# Patient Record
Sex: FEMALE | Race: WHITE | URBAN - METROPOLITAN AREA
[De-identification: names, ages, dates, MRNs, and addresses within clinical notes are randomized per-mention and may not be internally consistent; named-entity substitution may affect disease eponyms.]

---

## 2022-10-13 ENCOUNTER — OFFICE VISIT (OUTPATIENT)
Dept: ORTHOPEDIC SURGERY | Age: 47
End: 2022-10-13
Payer: COMMERCIAL

## 2022-10-13 VITALS — HEIGHT: 63 IN | WEIGHT: 170 LBS | BODY MASS INDEX: 30.12 KG/M2

## 2022-10-13 DIAGNOSIS — M75.02 ADHESIVE CAPSULITIS OF LEFT SHOULDER: Primary | ICD-10-CM

## 2022-10-13 PROBLEM — R61 DIAPHORESIS: Status: ACTIVE | Noted: 2022-01-06

## 2022-10-13 PROBLEM — L65.9 HAIR LOSS: Status: ACTIVE | Noted: 2022-01-06

## 2022-10-13 PROBLEM — R10.11 RIGHT UPPER QUADRANT PAIN: Status: ACTIVE | Noted: 2018-11-14

## 2022-10-13 PROBLEM — E04.9 GOITER: Status: ACTIVE | Noted: 2022-01-06

## 2022-10-13 PROBLEM — R19.7 DIARRHEA: Status: ACTIVE | Noted: 2022-01-06

## 2022-10-13 PROBLEM — K62.5 RECTAL HEMORRHAGE: Status: ACTIVE | Noted: 2022-10-13

## 2022-10-13 PROCEDURE — L3660 SO 8 AB RSTR CAN/WEB PRE OTS: HCPCS | Performed by: ORTHOPAEDIC SURGERY

## 2022-10-13 PROCEDURE — 99204 OFFICE O/P NEW MOD 45 MIN: CPT | Performed by: ORTHOPAEDIC SURGERY

## 2022-10-13 PROCEDURE — 20610 DRAIN/INJ JOINT/BURSA W/O US: CPT | Performed by: ORTHOPAEDIC SURGERY

## 2022-10-13 RX ORDER — FLUOCINONIDE CREAM (EMULSIFIED BASE) 0.5 MG/G
CREAM TOPICAL
COMMUNITY
Start: 2022-08-28

## 2022-10-13 RX ORDER — BUPROPION HYDROCHLORIDE 150 MG/1
TABLET ORAL
COMMUNITY
Start: 2022-10-11

## 2022-10-13 RX ORDER — ATENOLOL 100 MG/1
100 TABLET ORAL DAILY
COMMUNITY
Start: 2020-09-29

## 2022-10-13 RX ORDER — LOSARTAN POTASSIUM 100 MG/1
TABLET ORAL
COMMUNITY
Start: 2022-09-20

## 2022-10-13 RX ORDER — ARIPIPRAZOLE 10 MG/1
TABLET ORAL
COMMUNITY
Start: 2021-08-13

## 2022-10-13 RX ORDER — CLONAZEPAM 0.5 MG/1
TABLET ORAL
COMMUNITY
Start: 2022-09-27

## 2022-10-13 RX ORDER — PROPRANOLOL HCL 60 MG
CAPSULE, EXTENDED RELEASE 24HR ORAL
COMMUNITY
Start: 2022-10-11

## 2022-10-13 RX ORDER — METHYLPREDNISOLONE ACETATE 40 MG/ML
40 INJECTION, SUSPENSION INTRA-ARTICULAR; INTRALESIONAL; INTRAMUSCULAR; SOFT TISSUE ONCE
Status: COMPLETED | OUTPATIENT
Start: 2022-10-13 | End: 2022-10-13

## 2022-10-13 RX ORDER — ONDANSETRON 4 MG/1
4 TABLET, ORALLY DISINTEGRATING ORAL EVERY 6 HOURS PRN
COMMUNITY
Start: 2021-11-16

## 2022-10-13 RX ORDER — CITALOPRAM 10 MG/1
TABLET ORAL
COMMUNITY
Start: 2021-09-29

## 2022-10-13 RX ORDER — METHYLPREDNISOLONE 4 MG/1
TABLET ORAL
Qty: 1 KIT | Refills: 1 | Status: SHIPPED | OUTPATIENT
Start: 2022-10-13

## 2022-10-13 RX ORDER — BUPIVACAINE HYDROCHLORIDE 5 MG/ML
2 INJECTION, SOLUTION PERINEURAL ONCE
Status: COMPLETED | OUTPATIENT
Start: 2022-10-13 | End: 2022-10-13

## 2022-10-13 RX ORDER — QUETIAPINE FUMARATE 50 MG/1
50 TABLET, FILM COATED ORAL DAILY
COMMUNITY

## 2022-10-13 RX ORDER — HYDROCODONE BITARTRATE AND ACETAMINOPHEN 5; 325 MG/1; MG/1
1 TABLET ORAL EVERY 6 HOURS PRN
COMMUNITY
Start: 2021-11-16

## 2022-10-13 RX ORDER — MELOXICAM 15 MG/1
15 TABLET ORAL DAILY
Qty: 30 TABLET | Refills: 0 | Status: SHIPPED | OUTPATIENT
Start: 2022-10-13

## 2022-10-13 RX ADMIN — METHYLPREDNISOLONE ACETATE 40 MG: 40 INJECTION, SUSPENSION INTRA-ARTICULAR; INTRALESIONAL; INTRAMUSCULAR; SOFT TISSUE at 13:53

## 2022-10-13 RX ADMIN — BUPIVACAINE HYDROCHLORIDE 10 MG: 5 INJECTION, SOLUTION PERINEURAL at 13:54

## 2022-10-13 NOTE — PROGRESS NOTES
[address] [facility]  Outpatient Progress Note  Marti Muñoz MD    Patient Name: Be Padilla MRN: [id number]   Age: 52 y.o. YOB: 1975   Sex: female      [address] Complaint   Patient presents with    Shoulder Pain     Patient has has severe Left shoulder pain for months and has now gotten to where she \"cannont use the arm\" for any activities or loss of ROM due to the pain. HISTORY OF PRESENT ILLNESS   Be Padilla is a 52 y.o. female presents to the office today for evaluation of left shoulder pain and decreased range of motion. Symptoms have been insidious in onset beginning a couple of months ago gradually worsening to the point where she has severe pain when she tries to use her left arm. She is left-hand dominant. She had no injury. She has had no prior evaluation or treatment for this problem. She has tried over-the-counter ibuprofen    PAST MEDICAL HISTORY    No past medical history on file. PAST SURGICAL HISTORY   No past surgical history on file. MEDICATIONS     Current Outpatient Medications   Medication Sig Dispense Refill    ARIPiprazole (ABILIFY) 10 MG tablet TAKE 1 TABLET BY MOUTH EVERY DAY      atenolol (TENORMIN) 100 MG tablet Take 100 mg by mouth daily      citalopram (CELEXA) 10 MG tablet TAKE 1 TABLET BY MOUTH EVERY DAY      HYDROcodone-acetaminophen (NORCO) 5-325 MG per tablet Take 1 tablet by mouth every 6 hours as needed. ondansetron (ZOFRAN-ODT) 4 MG disintegrating tablet Take 4 mg by mouth every 6 hours as needed      methylPREDNISolone (MEDROL, VIETH,) 4 MG tablet Take by mouth.  1 kit 1    meloxicam (MOBIC) 15 MG tablet Take 1 tablet by mouth daily 30 tablet 0    buPROPion (WELLBUTRIN XL) 150 MG extended release tablet TAKE 1 TABLET BY MOUTH EVERY MORNING      clonazePAM (KLONOPIN) 0.5 MG tablet TAKE 1 TABLET BY MOUTH THREE TIMES A DAY AS NEEDED FOR ANXIETY      Fluocinonide Emulsified Base 0.05 % CREA APPLY TWO TIMES A DAY AS NEEDED      losartan (COZAAR) 100 MG tablet TAKE 1 TABLET BY MOUTH EVERY DAY      propranolol (INDERAL LA) 60 MG extended release capsule TAKE 1 CAPSULE BY MOUTH EVERY MORNING      QUEtiapine (SEROQUEL) 50 MG tablet Take 50 mg by mouth daily       No current facility-administered medications for this visit. ALLERGIES     Allergies   Allergen Reactions    Sulfa Antibiotics Shortness Of Breath    Morphine Other (See Comments) and Rash     \"my head was on fire\"      Peppermint Oil Hives, Nausea And Vomiting and Nausea Only       FAMILY HISTORY   No family history on file. SOCIAL HISTORY     Social History     Socioeconomic History    Marital status:      Spouse name: None    Number of children: None    Years of education: None    Highest education level: None   Tobacco Use    Smoking status: Never    Smokeless tobacco: Never   Substance and Sexual Activity    Alcohol use: Never       REVIEW OF SYSTEMS   General: no fever, chills, night sweats, anorexia, malaise, fatigue, or weight change  Hematologic:  no unexplained bleeding or bruising  HEENT:   no nasal congestion, rhinorrhea, sore throat, or facial pain  Respiratory:  no cough, dyspnea, or chest pain  Cardiovascular:  no angina, ROBLERO, PND, orthopnea, dependent edema, or palpitations  Gastrointestinal:  no nausea, vomiting, diarrhea, constipation, or abdominal pain  Genitourinary:  no urinary urgency, frequency, dysuria, or hematuria  Musculoskeletal: see HPI  Endocrine:  no heat or cold intolerance and no polyphagia, polydipsia, or polyuria  Skin:  no skin eruptions or changing lesions  Neurologic:  no focal weakness, numbness/tingling, tremor, or severe headache. See HPI. See HPI for pertinent positives.     PHYSICAL EXAM   Vital Signs: Ht 5' 3\" (1.6 m)   Wt 170 lb (77.1 kg)   BMI 30.11 kg/m²     General appearance: healthy, alert, no distress  Skin: Skin color, texture, turgor normal. No rashes or lesions  HEENT: atraumatic, normocephalic. PERRL  Respiratory: Unlabored breathing  Lymphatic: No adenopathy   Neuro: Alert and oriented, normal distal sensation, normal bilateral DTRs  Vascular: Normal distal capillary and distal pulses  Muskuloskeletal Exam:   Left Shoulder Examination    Inspection: There is no deformity, swelling, ecchymosis or atrophy present. Palpation: There is diffuse tenderness to palpation. Range of Motion: 120 degrees of forward flexion 70 degrees of abduction    Strength: There is mild global weakness detected,    Special Tests: There is pain throughout range of motion. She is unable to reach the base of her skull or the small of her back          RADIOLOGY   X-rays obtained and reviewed in office:  Views left shoulder 3 views    Impression: No acute abnormality, mild acromioclavicular joint arthrosis    IMPRESSION     1. Adhesive capsulitis of left shoulder         PLAN   I had a lengthy discussion with patient today regarding diagnosis and treatment options and recommendations. Plan to proceed with a corticosteroid injection in the subacromial space. I will prescribe a Medrol Dosepak and meloxicam and refer her to physical therapy    FOLLOWUP     Return in about 4 weeks (around 11/10/2022) for Reevaluation. Orders Placed This Encounter   Procedures    XR SHOULDER LEFT (MIN 2 VIEWS)     Standing Status:   Future     Number of Occurrences:   1     Standing Expiration Date:   10/13/2023    [facility]     Referral Priority:   Routine     Referral Type:   Eval and Treat     Referral Reason:   Specialty Services Required     Requested Specialty:   Physical Therapist     Number of Visits Requested:   1      Orders Placed This Encounter   Medications    methylPREDNISolone (MEDROL, IVETH,) 4 MG tablet     Sig: Take by mouth.      Dispense:  1 kit     Refill:  1    meloxicam (MOBIC) 15 MG tablet     Sig: Take 1 tablet by mouth daily     Dispense:  30 tablet     Refill:  0       Patient was instructed on appropriate use of braces, participation in home exercise programs, healthy lifestyle choices and weight loss as appropriate     Bonifacio Pérez MD

## 2022-10-13 NOTE — PATIENT INSTRUCTIONS
Joint Injection After [address] [facility]  Olesya Forman MD    Refer to this sheet in the next few days. These insructions provide you with information on caring for yourself after you have had a joint injection. Your caregiver also may give you more specific instructions. Your treatment has been planned according to current medical practices, but problems sometimes occur. Call your caregiver if you have any problems or questions after your procedure. After any type of joint injection, it is not uncommon to experience:    A temporary increase in pain which should resolve within 2-3 days  Soreness, swelling, or bruising around the injection site  Mild numbness, tingling, or weakness around the injection site caused by the numbing medicine used before or with the injection    It is also possible to experience the following effects associated with the specific agent after injection:    Corticosteroids (these effects are rare): Allergic reaction  Increased blood sugar levels (if you have diabetes and you notice that your blood sugar levels have increased, notify your caregiver)  Increased blood pressure levels  Mood swings  Hyaluronic acid in the use of viscosupplementation  Temporary heat or redness  Temporary rash and itching  Increased fluid accumulation in the injected join    These effects all should resolve within a day or two after your procedure. HOME CARE INSTRUCTIONS    Limit yourself to light activity the day of your procedure. Avoid lifting heavy objections, bending, stooping or twisting  Take prescription or over-the-counter pain medication as directed by your caregiver  You may apply ice to your injection site to reduce pain and swelling the day of your procedure.  Ice may be applied 3-4 times:  Put ice in a plastic bag  Place a towel between your skin and the bag  Leave the ice on for no longer than 15-20 minutes each time    FOLLOW-UP INSTRUCTIONS    Please make sure you keep your follow-up appointment as indicated by your physician.

## 2022-10-25 ENCOUNTER — HOSPITAL ENCOUNTER (OUTPATIENT)
Dept: PHYSICAL THERAPY | Age: 47
Setting detail: THERAPIES SERIES
Discharge: HOME | End: 2022-10-25

## 2022-10-25 NOTE — PROGRESS NOTES
[facility]    Physical Therapy  Cancellation/No-show Note  Patient Name:  Meseret Solis  :  1975   Date:  10/25/2022    Cancelled visits to date: 1 eval  No-shows to date: 0    For today's appointment patient:  [x]  Cancelled 10/25  []  Rescheduled appointment  []  No-show     Reason given by patient:  [x]  Patient ill  []  Conflicting appointment  []  No transportation    []  Conflict with work  []  No reason given  []  Other:     Comments:      Phone call information:   []  Phone call made today to patient at _ time at number provided:      []  Patient answered, conversation as follows:    []  Patient did not answer, message left as follows:  []  Phone call not made today  [x]  Phone call not needed - pt contacted us to cancel and provided reason for cancellation.      Electronically signed by:  Kirsten Baltazar, PT, MPT

## 2022-11-01 ENCOUNTER — HOSPITAL ENCOUNTER (OUTPATIENT)
Dept: PHYSICAL THERAPY | Age: 47
Setting detail: THERAPIES SERIES
Discharge: HOME | End: 2022-11-01

## 2022-11-01 NOTE — PROGRESS NOTES
[facility]    Physical Therapy  Cancellation/No-show Note  Patient Name:  Eliseo Jaramillo  :  1975   Date:  2022    Cancelled visits to date: 12eval  No-shows to date: 0    For today's appointment patient:  [x]  Cancelled 10/25,   []  Rescheduled appointment  []  No-show     Reason given by patient:  [x]  Patient ill  []  Conflicting appointment  []  No transportation    []  Conflict with work  []  No reason given  []  Other:     Comments:      Phone call information:   []  Phone call made today to patient at _ time at number provided:      []  Patient answered, conversation as follows:    []  Patient did not answer, message left as follows:  []  Phone call not made today  [x]  Phone call not needed - pt contacted us to cancel and provided reason for cancellation.      Electronically signed by:  Mj Contreras, PT, MPT

## 2022-11-10 ENCOUNTER — HOSPITAL ENCOUNTER (OUTPATIENT)
Dept: PHYSICAL THERAPY | Age: 47
Setting detail: THERAPIES SERIES
Discharge: HOME | End: 2022-11-10

## 2022-11-10 NOTE — PROGRESS NOTES
[facility]    Physical Therapy  Cancellation/No-show Note  Patient Name:  Ioana Jones  :  1975   Date:  11/10/2022    Cancelled visits to date: 0  No-shows to date: 1 eval    For today's appointment patient:  []  Cancelled  []  Rescheduled appointment  [x]  No-show  11/10     Reason given by patient:  []  Patient ill  []  Conflicting appointment  []  No transportation    []  Conflict with work  []  No reason given  []  Other:     Comments:      Phone call information:   []  Phone call made today to patient at _ time at number provided:      []  Patient answered, conversation as follows:    []  Patient did not answer, message left as follows:  [x]  Phone call not made today  []  Phone call not needed - pt contacted us to cancel and provided reason for cancellation.      Electronically signed by:  Subhash Cross, PT, MPT

## 2024-06-25 LAB — MAMMOGRAPHY, EXTERNAL: NORMAL

## 2025-04-03 SDOH — HEALTH STABILITY: PHYSICAL HEALTH: ON AVERAGE, HOW MANY MINUTES DO YOU ENGAGE IN EXERCISE AT THIS LEVEL?: 0 MIN

## 2025-04-03 SDOH — HEALTH STABILITY: PHYSICAL HEALTH: ON AVERAGE, HOW MANY DAYS PER WEEK DO YOU ENGAGE IN MODERATE TO STRENUOUS EXERCISE (LIKE A BRISK WALK)?: 0 DAYS

## 2025-04-04 ENCOUNTER — OFFICE VISIT (OUTPATIENT)
Dept: PRIMARY CARE CLINIC | Age: 50
End: 2025-04-04
Payer: COMMERCIAL

## 2025-04-04 VITALS
HEIGHT: 63 IN | OXYGEN SATURATION: 98 % | SYSTOLIC BLOOD PRESSURE: 138 MMHG | TEMPERATURE: 97.8 F | HEART RATE: 65 BPM | BODY MASS INDEX: 45.46 KG/M2 | DIASTOLIC BLOOD PRESSURE: 72 MMHG | WEIGHT: 256.6 LBS

## 2025-04-04 DIAGNOSIS — R53.83 FATIGUE, UNSPECIFIED TYPE: ICD-10-CM

## 2025-04-04 DIAGNOSIS — E66.01 CLASS 3 SEVERE OBESITY DUE TO EXCESS CALORIES WITHOUT SERIOUS COMORBIDITY WITH BODY MASS INDEX (BMI) OF 45.0 TO 49.9 IN ADULT: ICD-10-CM

## 2025-04-04 DIAGNOSIS — Z13.0 SCREENING FOR DEFICIENCY ANEMIA: ICD-10-CM

## 2025-04-04 DIAGNOSIS — Z13.1 DIABETES MELLITUS SCREENING: Primary | ICD-10-CM

## 2025-04-04 DIAGNOSIS — Z13.21 ENCOUNTER FOR VITAMIN DEFICIENCY SCREENING: ICD-10-CM

## 2025-04-04 DIAGNOSIS — Z13.220 LIPID SCREENING: ICD-10-CM

## 2025-04-04 DIAGNOSIS — E66.813 CLASS 3 SEVERE OBESITY DUE TO EXCESS CALORIES WITHOUT SERIOUS COMORBIDITY WITH BODY MASS INDEX (BMI) OF 45.0 TO 49.9 IN ADULT: ICD-10-CM

## 2025-04-04 DIAGNOSIS — F60.3 BORDERLINE PERSONALITY DISORDER (HCC): ICD-10-CM

## 2025-04-04 DIAGNOSIS — G47.00 CONTROLLED INSOMNIA: ICD-10-CM

## 2025-04-04 DIAGNOSIS — Z12.4 CERVICAL CANCER SCREENING: ICD-10-CM

## 2025-04-04 DIAGNOSIS — Z11.4 SCREENING FOR HUMAN IMMUNODEFICIENCY VIRUS: ICD-10-CM

## 2025-04-04 DIAGNOSIS — F41.9 ANXIETY: ICD-10-CM

## 2025-04-04 DIAGNOSIS — Z00.00 PERIODIC HEALTH ASSESSMENT, GENERAL SCREENING, ADULT: ICD-10-CM

## 2025-04-04 DIAGNOSIS — F31.9 BIPOLAR DEPRESSION (HCC): ICD-10-CM

## 2025-04-04 DIAGNOSIS — Z13.29 THYROID DISORDER SCREENING: ICD-10-CM

## 2025-04-04 DIAGNOSIS — Z12.31 BREAST CANCER SCREENING BY MAMMOGRAM: ICD-10-CM

## 2025-04-04 DIAGNOSIS — Z11.59 ENCOUNTER FOR HCV SCREENING TEST FOR LOW RISK PATIENT: ICD-10-CM

## 2025-04-04 PROBLEM — R10.11 RIGHT UPPER QUADRANT PAIN: Status: RESOLVED | Noted: 2018-11-14 | Resolved: 2025-04-04

## 2025-04-04 PROBLEM — R61 DIAPHORESIS: Status: RESOLVED | Noted: 2022-01-06 | Resolved: 2025-04-04

## 2025-04-04 PROBLEM — K62.5 RECTAL HEMORRHAGE: Status: RESOLVED | Noted: 2022-10-13 | Resolved: 2025-04-04

## 2025-04-04 PROBLEM — L65.9 HAIR LOSS: Status: RESOLVED | Noted: 2022-01-06 | Resolved: 2025-04-04

## 2025-04-04 PROBLEM — R19.7 DIARRHEA: Status: RESOLVED | Noted: 2022-01-06 | Resolved: 2025-04-04

## 2025-04-04 PROCEDURE — 99204 OFFICE O/P NEW MOD 45 MIN: CPT | Performed by: NURSE PRACTITIONER

## 2025-04-04 PROCEDURE — 3075F SYST BP GE 130 - 139MM HG: CPT | Performed by: NURSE PRACTITIONER

## 2025-04-04 PROCEDURE — 3078F DIAST BP <80 MM HG: CPT | Performed by: NURSE PRACTITIONER

## 2025-04-04 RX ORDER — DEXTROAMPHETAMINE SACCHARATE, AMPHETAMINE ASPARTATE MONOHYDRATE, DEXTROAMPHETAMINE SULFATE AND AMPHETAMINE SULFATE 3.75; 3.75; 3.75; 3.75 MG/1; MG/1; MG/1; MG/1
15 CAPSULE, EXTENDED RELEASE ORAL DAILY
COMMUNITY
Start: 2024-04-16 | End: 2025-04-04 | Stop reason: DRUGHIGH

## 2025-04-04 RX ORDER — DEXTROAMPHETAMINE SACCHARATE, AMPHETAMINE ASPARTATE MONOHYDRATE, DEXTROAMPHETAMINE SULFATE AND AMPHETAMINE SULFATE 5; 5; 5; 5 MG/1; MG/1; MG/1; MG/1
20 CAPSULE, EXTENDED RELEASE ORAL EVERY MORNING
COMMUNITY
Start: 2025-04-01

## 2025-04-04 SDOH — ECONOMIC STABILITY: FOOD INSECURITY: WITHIN THE PAST 12 MONTHS, YOU WORRIED THAT YOUR FOOD WOULD RUN OUT BEFORE YOU GOT MONEY TO BUY MORE.: NEVER TRUE

## 2025-04-04 SDOH — ECONOMIC STABILITY: FOOD INSECURITY: WITHIN THE PAST 12 MONTHS, THE FOOD YOU BOUGHT JUST DIDN'T LAST AND YOU DIDN'T HAVE MONEY TO GET MORE.: NEVER TRUE

## 2025-04-04 ASSESSMENT — PATIENT HEALTH QUESTIONNAIRE - PHQ9
4. FEELING TIRED OR HAVING LITTLE ENERGY: NEARLY EVERY DAY
9. THOUGHTS THAT YOU WOULD BE BETTER OFF DEAD, OR OF HURTING YOURSELF: SEVERAL DAYS
SUM OF ALL RESPONSES TO PHQ QUESTIONS 1-9: 12
SUM OF ALL RESPONSES TO PHQ QUESTIONS 1-9: 12
3. TROUBLE FALLING OR STAYING ASLEEP: NOT AT ALL
5. POOR APPETITE OR OVEREATING: NEARLY EVERY DAY
8. MOVING OR SPEAKING SO SLOWLY THAT OTHER PEOPLE COULD HAVE NOTICED. OR THE OPPOSITE, BEING SO FIGETY OR RESTLESS THAT YOU HAVE BEEN MOVING AROUND A LOT MORE THAN USUAL: NOT AT ALL
10. IF YOU CHECKED OFF ANY PROBLEMS, HOW DIFFICULT HAVE THESE PROBLEMS MADE IT FOR YOU TO DO YOUR WORK, TAKE CARE OF THINGS AT HOME, OR GET ALONG WITH OTHER PEOPLE: EXTREMELY DIFFICULT
SUM OF ALL RESPONSES TO PHQ QUESTIONS 1-9: 12
6. FEELING BAD ABOUT YOURSELF - OR THAT YOU ARE A FAILURE OR HAVE LET YOURSELF OR YOUR FAMILY DOWN: SEVERAL DAYS
1. LITTLE INTEREST OR PLEASURE IN DOING THINGS: NEARLY EVERY DAY
2. FEELING DOWN, DEPRESSED OR HOPELESS: SEVERAL DAYS
SUM OF ALL RESPONSES TO PHQ QUESTIONS 1-9: 11
7. TROUBLE CONCENTRATING ON THINGS, SUCH AS READING THE NEWSPAPER OR WATCHING TELEVISION: NOT AT ALL

## 2025-04-04 ASSESSMENT — ANXIETY QUESTIONNAIRES
1. FEELING NERVOUS, ANXIOUS, OR ON EDGE: SEVERAL DAYS
IF YOU CHECKED OFF ANY PROBLEMS ON THIS QUESTIONNAIRE, HOW DIFFICULT HAVE THESE PROBLEMS MADE IT FOR YOU TO DO YOUR WORK, TAKE CARE OF THINGS AT HOME, OR GET ALONG WITH OTHER PEOPLE: EXTREMELY DIFFICULT
2. NOT BEING ABLE TO STOP OR CONTROL WORRYING: NEARLY EVERY DAY
GAD7 TOTAL SCORE: 12
4. TROUBLE RELAXING: NEARLY EVERY DAY
6. BECOMING EASILY ANNOYED OR IRRITABLE: NOT AT ALL
3. WORRYING TOO MUCH ABOUT DIFFERENT THINGS: NEARLY EVERY DAY
5. BEING SO RESTLESS THAT IT IS HARD TO SIT STILL: SEVERAL DAYS
7. FEELING AFRAID AS IF SOMETHING AWFUL MIGHT HAPPEN: SEVERAL DAYS

## 2025-04-04 ASSESSMENT — ENCOUNTER SYMPTOMS
SHORTNESS OF BREATH: 0
CHEST TIGHTNESS: 0

## 2025-04-04 NOTE — PROGRESS NOTES
4/4/2025    Chief Complaint   Patient presents with    New Patient     49-year-old female new patient to establish, pt concerned about her thyroid and would like to discuss obesity/ weight loss options       Samra Castillo is a 49 y.o. female, presents today to [facility] care.  Reports fatigue and obesity    HPI   Patient is treated for anxiety, bipolar depression, boaderline personality and insomnia  She follows with psychiatrist, by Dr. Brian Yoon MD (follow-up appointments every 6 weeks)  and outpatient therapist (monthly).    Hx of  two separate 30 day hospitalizations at [facility]. She has also attended PHP following hospitalizations.     Current medications include:  - Clonazepam 1 mg twice daily  - Abilify 10 mg daily  - Wellbutrin  mg every morning  - Propranolol (Inderal LA) 60 mg every morning  - Seroquel 50 mg daily.      She reports staying at home most of the time due to anxiety, reporting increased anxiety with leaving her home.  Patient reports symptoms are otherwise well-controlled and stable with current medications and dosages.     Denies side effects from medication.  Homicidal and suicidal ideation.      PDMP Monitoring:  Last PDMP Quang as Reviewed:  Review User Review Instant Review Result   ADOLFOLOVE ELKINSY 4/4/2025  9:40 AM Reviewed PDMP [1]     PHQ-9      4/4/2025     1:58 PM   PHQ Scores   PHQ2 Score 4   PHQ9 Score 12     Interpretation of Total Score Depression Severity: 1-4 = Minimal depression, 5-9 = Mild depression, 10-14 = Moderate depression, 15-19 = Moderately severe depression, 20-27 = Severe depression         4/4/2025     1:58 PM   PHQ-9    Little interest or pleasure in doing things 3   Feeling down, depressed, or hopeless 1   Trouble falling or staying asleep, or sleeping too much 0   Feeling tired or having little energy 3   Poor appetite or overeating 3   Feeling bad about yourself - or that you are a failure or have let yourself or your family down 1   Trouble

## 2025-04-04 NOTE — PATIENT INSTRUCTIONS
Anxiety and Depression  - Practice self care and daily fresh air for 20 minutes.  - Regular exercise program (work up to 150 minutes per week)  - Practice self relaxation with music or meditation etc. Phone apps such as 4 the stars: Mediatation and Relaxation, or Calm.    - The crisis number for [facility] is [phone].  You can use this number at any time to access emergency mental health services.    - Text HOME to 384493 from anywhere in the [place], anytime, about any type of crisis.  Crisis Text Line serves anyone, in any type of crisis, providing access to free, 24/7.  The first two responses are automated. They tell you that you're being connected with a Crisis Counselor, and invite you to share a bit more.  The Crisis Counselor is a trained volunteer, not a professional.  It usually takes less than five minutes to connect you with a Crisis Counselor. The goal of any conversation is to get you to a calm, safe place.

## 2025-04-10 NOTE — PROGRESS NOTES
Patient's HM shows they are overdue for Mammogram.  Care Everywhere and  files searched.   updated with 6/25/24 mammogram.

## 2025-05-12 ENCOUNTER — TELEPHONE (OUTPATIENT)
Dept: BARIATRICS/WEIGHT MGMT | Age: 50
End: 2025-05-12

## 2025-05-16 ENCOUNTER — TELEPHONE (OUTPATIENT)
Dept: BARIATRICS/WEIGHT MGMT | Age: 50
End: 2025-05-16

## 2025-07-01 ENCOUNTER — HOSPITAL ENCOUNTER (OUTPATIENT)
Dept: MAMMOGRAPHY | Age: 50
Discharge: HOME | End: 2025-07-01
Payer: COMMERCIAL

## 2025-07-01 DIAGNOSIS — Z13.21 ENCOUNTER FOR VITAMIN DEFICIENCY SCREENING: ICD-10-CM

## 2025-07-01 DIAGNOSIS — Z11.59 ENCOUNTER FOR HCV SCREENING TEST FOR LOW RISK PATIENT: ICD-10-CM

## 2025-07-01 DIAGNOSIS — Z13.1 DIABETES MELLITUS SCREENING: ICD-10-CM

## 2025-07-01 DIAGNOSIS — Z13.0 SCREENING FOR DEFICIENCY ANEMIA: ICD-10-CM

## 2025-07-01 DIAGNOSIS — R53.83 FATIGUE, UNSPECIFIED TYPE: ICD-10-CM

## 2025-07-01 DIAGNOSIS — Z12.31 BREAST CANCER SCREENING BY MAMMOGRAM: ICD-10-CM

## 2025-07-01 DIAGNOSIS — Z00.00 PERIODIC HEALTH ASSESSMENT, GENERAL SCREENING, ADULT: ICD-10-CM

## 2025-07-01 DIAGNOSIS — Z13.29 THYROID DISORDER SCREENING: ICD-10-CM

## 2025-07-01 DIAGNOSIS — Z13.220 LIPID SCREENING: ICD-10-CM

## 2025-07-01 DIAGNOSIS — Z11.4 SCREENING FOR HUMAN IMMUNODEFICIENCY VIRUS: ICD-10-CM

## 2025-07-01 LAB
25(OH)D3 SERPL-MCNC: 6 NG/ML
ALBUMIN SERPL-MCNC: 4.6 G/DL (ref 3.4–5)
ALBUMIN/GLOB SERPL: 2.3 {RATIO} (ref 1.1–2.2)
ALP SERPL-CCNC: 94 U/L (ref 40–129)
ALT SERPL-CCNC: 54 U/L (ref 10–40)
ANION GAP SERPL CALCULATED.3IONS-SCNC: 12 MMOL/L (ref 3–16)
AST SERPL-CCNC: 34 U/L (ref 15–37)
BASOPHILS # BLD: 0 K/UL (ref 0–0.2)
BASOPHILS NFR BLD: 0.4 %
BILIRUB SERPL-MCNC: 0.4 MG/DL (ref 0–1)
BUN SERPL-MCNC: 11 MG/DL (ref 7–20)
CALCIUM SERPL-MCNC: 9.2 MG/DL (ref 8.3–10.6)
CHLORIDE SERPL-SCNC: 103 MMOL/L (ref 99–110)
CHOLEST SERPL-MCNC: 148 MG/DL (ref 0–199)
CO2 SERPL-SCNC: 24 MMOL/L (ref 21–32)
CREAT SERPL-MCNC: 1.1 MG/DL (ref 0.6–1.1)
DEPRECATED RDW RBC AUTO: 14.3 % (ref 12.4–15.4)
EOSINOPHIL # BLD: 1.2 K/UL (ref 0–0.6)
EOSINOPHIL NFR BLD: 13.7 %
GFR SERPLBLD CREATININE-BSD FMLA CKD-EPI: 61 ML/MIN/{1.73_M2}
GLUCOSE P FAST SERPL-MCNC: 132 MG/DL (ref 70–99)
HCT VFR BLD AUTO: 41.3 % (ref 36–48)
HCV AB SERPL QL IA: NORMAL
HDLC SERPL-MCNC: 52 MG/DL (ref 40–60)
HGB BLD-MCNC: 14.1 G/DL (ref 12–16)
LDL CHOLESTEROL: 77 MG/DL
LYMPHOCYTES # BLD: 2 K/UL (ref 1–5.1)
LYMPHOCYTES NFR BLD: 23.8 %
MCH RBC QN AUTO: 30.8 PG (ref 26–34)
MCHC RBC AUTO-ENTMCNC: 34.2 G/DL (ref 31–36)
MCV RBC AUTO: 89.9 FL (ref 80–100)
MONOCYTES # BLD: 0.5 K/UL (ref 0–1.3)
MONOCYTES NFR BLD: 5.7 %
NEUTROPHILS # BLD: 4.9 K/UL (ref 1.7–7.7)
NEUTROPHILS NFR BLD: 56.4 %
PLATELET # BLD AUTO: 272 K/UL (ref 135–450)
PMV BLD AUTO: 8.8 FL (ref 5–10.5)
POTASSIUM SERPL-SCNC: 4.6 MMOL/L (ref 3.5–5.1)
PROT SERPL-MCNC: 6.6 G/DL (ref 6.4–8.2)
RBC # BLD AUTO: 4.59 M/UL (ref 4–5.2)
SODIUM SERPL-SCNC: 139 MMOL/L (ref 136–145)
T4 FREE SERPL-MCNC: 1 NG/DL (ref 0.9–1.8)
TRIGL SERPL-MCNC: 97 MG/DL (ref 0–150)
TSH SERPL DL<=0.005 MIU/L-ACNC: 8.57 UIU/ML (ref 0.27–4.2)
VLDLC SERPL CALC-MCNC: 19 MG/DL
WBC # BLD AUTO: 8.6 K/UL (ref 4–11)

## 2025-07-01 PROCEDURE — 77063 BREAST TOMOSYNTHESIS BI: CPT

## 2025-07-02 LAB
EST. AVERAGE GLUCOSE BLD GHB EST-MCNC: 111.2 MG/DL
HBA1C MFR BLD: 5.5 %
HIV 1+2 AB+HIV1 P24 AG SERPL QL IA: NORMAL
HIV 2 AB SERPL QL IA: NORMAL
HIV1 AB SERPL QL IA: NORMAL
HIV1 P24 AG SERPL QL IA: NORMAL

## 2025-07-03 LAB
EBV EA-D IGG SER-ACNC: 10.5 U/ML (ref 0–10.9)
EBV NA IGG SER IA-ACNC: 377 U/ML (ref 0–21.9)
EBV VCA IGG SER IA-ACNC: 156 U/ML (ref 0–21.9)
EBV VCA IGM SER IA-ACNC: <10 U/ML (ref 0–43.9)

## 2025-07-07 ENCOUNTER — TELEPHONE (OUTPATIENT)
Dept: BARIATRICS/WEIGHT MGMT | Age: 50
End: 2025-07-07

## 2025-07-08 ENCOUNTER — OFFICE VISIT (OUTPATIENT)
Dept: BARIATRICS/WEIGHT MGMT | Age: 50
End: 2025-07-08
Payer: COMMERCIAL

## 2025-07-08 VITALS
SYSTOLIC BLOOD PRESSURE: 130 MMHG | RESPIRATION RATE: 18 BRPM | DIASTOLIC BLOOD PRESSURE: 80 MMHG | HEART RATE: 93 BPM | WEIGHT: 250.4 LBS | HEIGHT: 63 IN | OXYGEN SATURATION: 96 % | BODY MASS INDEX: 44.37 KG/M2

## 2025-07-08 DIAGNOSIS — E66.01 MORBID OBESITY WITH BMI OF 40.0-44.9, ADULT (HCC): ICD-10-CM

## 2025-07-08 DIAGNOSIS — I10 ESSENTIAL HYPERTENSION: ICD-10-CM

## 2025-07-08 DIAGNOSIS — Z01.818 PREOPERATIVE CLEARANCE: Primary | ICD-10-CM

## 2025-07-08 PROCEDURE — G2211 COMPLEX E/M VISIT ADD ON: HCPCS | Performed by: SURGERY

## 2025-07-08 PROCEDURE — 3075F SYST BP GE 130 - 139MM HG: CPT | Performed by: SURGERY

## 2025-07-08 PROCEDURE — 3079F DIAST BP 80-89 MM HG: CPT | Performed by: SURGERY

## 2025-07-08 PROCEDURE — 99204 OFFICE O/P NEW MOD 45 MIN: CPT | Performed by: SURGERY

## 2025-07-08 NOTE — PROGRESS NOTES
Samra Castillo is a 49 y.o. female with a date of birth of 1975.    Vitals:    07/08/25 1215   BP: 130/80   Pulse: 93   Resp: 18   SpO2: 96%    BMI: Body mass index is 44.36 kg/m². Obesity Classification: Class III    Weight History & Health Goals  Weight History:   Wt Readings from Last 3 Encounters:   07/08/25 113.6 kg (250 lb 6.4 oz)   04/04/25 116.4 kg (256 lb 9.6 oz)   10/13/22 77.1 kg (170 lb)       Patient's lowest adult weight was 118 lbs at age 18.   Patient's highest adult weight was 250 lbs at age 49.   Goal Weight: 130  Health improvements patient is hoping to see with bariatric surgery: better self esteem, better breathing, more mobility, more energy     Food Restrictions  Patient does have food allergies/sensitivities - mint.  Patient does not have lactose intolerance.  Patient does not have Caodaism/cultural food preferences.   Patient does not have a sensitivity/intolerance to artificial sweeteners.     Previous Diet Attempts  Patient has participated in the following weight loss programs: Atkins, Calorie Restriction, Fasting, Grapefruit, Keto, Low Carb , Low Fat , [place], and The Zone  Patient has not participated in weight loss medications- None.  Patient has participated in meal replacement/liquid diets- Juicing, Metabolife/ Herbalife, and Slimfast.      Activity & Typical Eating Habits  Physical Activity: none    Pt reports that they often eats meals quickly (<15 minutes) and feels overly full stuffed after eating   Patient does have a history of stress eating, emotional eating or eating out of boredom.  Patient does not have night eating.  Patient does not have grazing.   Patient does not meet the criteria for binge eating disorder.     Beverages throughout the day: diet soda, diet energy drinks, tea, coffee-shop coffee  Pt does not drink alcohol.    Including all meals & snacks, patient reports eating 2-3 times per day on weekdays. Of these, none are prepared at home.     24 Hour Diet

## 2025-07-08 NOTE — PROGRESS NOTES
[facility]   [facility]  Jonathan Panda MD, FACS, [place] [address], [address]    [facility] [address] .  Phone: [phone]  Fax: [phone]         Medical Weight Loss Consultation         Chief Complaint   Patient presents with    Bariatric, Initial Visit     NP MWM vs Surg BCBS-plan excusion         HPI:    Samra Castillo is a very pleasant 49 y.o. obese female ,   Body mass index is 44.36 kg/m². And multiple medical problems who is presenting for weight loss evaluation and consultation by Nely Jovel.    Patient has been struggling for several years now with obesity. Patient feels the weight is an obstacle to achieve and perform things in  daily living and is posing risk on health.     Tries to diet, and exercise but can't keep the weight off.  Patient tried  Atkins, Calorie Restriction, Fasting, Grapefruit, Keto, Low Carb , Low Fat , [place], and The Zone  Patient has not participated in weight loss medications- None.  Patient has participated in meal replacement/liquid diets- Juicing, Metabolife/ Herbalife, and Slimfast and other regimens, but with no sustainable weight loss.     Patient  is very determined to lose weight and be healthy, and is interested in joining our weight loss program to achieve this goal.    Otherwise patient denies any nausea, vomiting, fevers, chills, shortness of breath, chest pain, constipation or urinary symptoms.      Pain Assessment   Denies any abdominal pain     Past Medical History:   Diagnosis Date    Abdominal hernia     Anemia, unspecified 2012    Anxiety     Atypical chest pain 2015    Bipolar depression (HCC)     Borderline personality disorder (HCC)     Chronic back pain     Controlled insomnia     Hair loss 2022    Hypothyroidism     Obesity     Rectal hemorrhage 10/13/2022    Right upper quadrant pain 2018     Past Surgical History:   Procedure Laterality Date     SECTION